# Patient Record
Sex: FEMALE | Race: WHITE | NOT HISPANIC OR LATINO | Employment: FULL TIME | URBAN - METROPOLITAN AREA
[De-identification: names, ages, dates, MRNs, and addresses within clinical notes are randomized per-mention and may not be internally consistent; named-entity substitution may affect disease eponyms.]

---

## 2018-04-02 ENCOUNTER — OFFICE VISIT (OUTPATIENT)
Dept: RHEUMATOLOGY | Facility: CLINIC | Age: 23
End: 2018-04-02
Payer: COMMERCIAL

## 2018-04-02 VITALS — DIASTOLIC BLOOD PRESSURE: 78 MMHG | WEIGHT: 145.19 LBS | SYSTOLIC BLOOD PRESSURE: 118 MMHG

## 2018-04-02 DIAGNOSIS — R76.8 POSITIVE ANA (ANTINUCLEAR ANTIBODY): Primary | ICD-10-CM

## 2018-04-02 LAB
ALBUMIN SERPL-MCNC: 4.3 G/DL (ref 3.1–4.7)
ALP SERPL-CCNC: 42 IU/L (ref 40–104)
ALT (SGPT): 16 IU/L (ref 3–33)
AST SERPL-CCNC: 20 IU/L (ref 10–40)
BASOPHILS NFR BLD: 0 K/UL (ref 0–0.2)
BASOPHILS NFR BLD: 0.5 %
BILIRUB SERPL-MCNC: 0.7 MG/DL (ref 0.3–1)
BUN SERPL-MCNC: 10 MG/DL (ref 8–20)
CALCIUM SERPL-MCNC: 9.2 MG/DL (ref 7.7–10.4)
CHLORIDE: 104 MMOL/L (ref 98–110)
CO2 SERPL-SCNC: 25.6 MMOL/L (ref 22.8–31.6)
CREATININE: 0.67 MG/DL (ref 0.6–1.4)
EOSINOPHIL NFR BLD: 0.1 K/UL (ref 0–0.7)
EOSINOPHIL NFR BLD: 1.4 %
ERYTHROCYTE [DISTWIDTH] IN BLOOD BY AUTOMATED COUNT: 12.1 % (ref 11.7–14.9)
GLUCOSE: 87 MG/DL (ref 70–99)
GRAN #: 3.9 K/UL (ref 1.4–6.5)
GRAN%: 62.1 %
HCT VFR BLD AUTO: 40.5 % (ref 36–48)
HGB BLD-MCNC: 13.3 G/DL (ref 12–15)
IMMATURE GRANS (ABS): 0 K/UL (ref 0–1)
IMMATURE GRANULOCYTES: 0.2 %
LYMPH #: 1.7 K/UL (ref 1.2–3.4)
LYMPH%: 26.4 %
MCH RBC QN AUTO: 30.7 PG (ref 25–35)
MCHC RBC AUTO-ENTMCNC: 32.8 G/DL (ref 31–36)
MCV RBC AUTO: 93.5 FL (ref 79–98)
MONO #: 0.6 K/UL (ref 0.1–0.6)
MONO%: 9.4 %
NUCLEATED RBCS: 0 %
PLATELET # BLD AUTO: 329 K/UL (ref 140–440)
PMV BLD AUTO: 9.9 FL (ref 8.8–12.7)
POTASSIUM SERPL-SCNC: 3.7 MMOL/L (ref 3.5–5)
PROT SERPL-MCNC: 7.7 G/DL (ref 6–8.2)
RBC # BLD AUTO: 4.33 M/UL (ref 3.5–5.5)
SODIUM: 139 MMOL/L (ref 134–144)
TSH SERPL DL<=0.005 MIU/L-ACNC: 0.87 ULU/ML (ref 0.3–5.6)
WBC # BLD AUTO: 6.3 K/UL (ref 5–10)

## 2018-04-02 PROCEDURE — 99203 OFFICE O/P NEW LOW 30 MIN: CPT | Mod: ,,, | Performed by: INTERNAL MEDICINE

## 2018-04-02 RX ORDER — FEXOFENADINE HCL 60 MG
60 TABLET ORAL DAILY
COMMUNITY

## 2018-04-02 RX ORDER — OMEPRAZOLE 20 MG/1
CAPSULE, DELAYED RELEASE ORAL
COMMUNITY
Start: 2017-12-29

## 2018-04-02 RX ORDER — NORGESTIMATE AND ETHINYL ESTRADIOL 0.25-0.035
KIT ORAL
COMMUNITY
Start: 2018-03-14 | End: 2023-06-16

## 2018-04-02 NOTE — LETTER
April 2, 2018        Lita Corbett, RADHA  [address]  [place] [address]             [facility]  [address]  [address]  [place] [address]  Phone: [phone]  Fax: [phone]   Patient: Estrella Miller   MR Number: [id number]   YOB: 1995   Date of Visit: 4/2/2018       Dear Dr. Corbett:    Thank you for referring Estrella Miller to me for evaluation. Attached you will find relevant portions of my assessment and plan of care.    If you have questions, please do not hesitate to call me. I look forward to following Estrella Miller along with you.    Sincerely,      Jp Salas MD            CC  No Recipients    Enclosure

## 2018-04-02 NOTE — LETTER
April 2, 2018        No Recipients             [facility]  [address]  [address]  [place] [address]  Phone: [phone]  Fax: [phone]   Patient: Estrella Miller   MR Number: [id number]   YOB: 1995   Date of Visit: 4/2/2018       Dear Dr. Silva Recipients:    Thank you for referring Estrella Miller to me for evaluation. Below are the relevant portions of my assessment and plan of care.            If you have questions, please do not hesitate to call me. I look forward to following Estrella along with you.    Sincerely,      Jp Salas MD           CC  No Recipients

## 2018-04-02 NOTE — PROGRESS NOTES
[facility]        NEW PATIENT      Subjective:       Patient ID:   NAME: Estrella Miller : 1995     22 y.o. female    Referring Doc: No ref. provider found  Other Physicians:    Chief Complaint:  Possible Lupus      History of Present Illness:     New patient referred for positive KOKO .Patient doesn't know why they KOKO was done in the first place. She denies any chronic fatigue ,skin rashes  Or photosensitivity. No Raynaud's No chest pains, cough or shortness of breath. No mucosal ulcerations.No sicca sxs.  No joint swelling or pain.         ROS:   GEN: no fevers night sweats or significant weight changes    fatigue  HEENT: no HA's, changes in vision , no mouth ulcers, no sicca symptoms, no scalp tenderness, jaw claudication  CV: no CP, SOB, PND, DUTTA or orthopnea,no palpitations  PULM:no SOB, cough, hemoptysis, sputum or pleuritic pain  GI: no abdominal pain, nausea, vomiting, constipation,+ bouts of  Diarrhea, no blood or mucus.Colonoscopy done,no dx of inflammatory bowel disease,  No melanotic stools, BRBPR, or hematemesis, no dysphagia  : no hematuria, dysuria  NEURO:no paresthesias, headaches, visual disturbances, muscle weakness  SKIN:  no rashes , erythema, bruising, or swelling, no Raynauds, no photosensitivity  MUSCULOSKELETAL:no joint swelling, no prolonged AM stiffness, no back pain   PSYCH:    Insomnia,   depression,  anxiety    Medications:    Current Outpatient Prescriptions:     ascorbic acid (VITAMIN C) 500 MG tablet, Take 500 mg by mouth once daily., Disp: , Rfl:     ESTARYLLA 0.25-35 mg-mcg per tablet, , Disp: , Rfl:     fexofenadine (ALLEGRA) 60 MG tablet, Take 60 mg by mouth once daily., Disp: , Rfl:     fluticasone (FLONASE) 50 mcg/actuation nasal spray, 2 sprays by Nasal route once daily.  , Disp: , Rfl:     ibuprofen (ADVIL,MOTRIN) 200 MG tablet, Take 600 mg by mouth every 6 to 8 hours as needed.  , Disp: , Rfl:     lactobacillus rhamnosus, GG, (CULTURELLE) 10  billion cell capsule, Take 1 capsule by mouth once daily., Disp: , Rfl:     multivitamin (MULTIVITAMIN) per tablet, Take 1 tablet by mouth once daily.  , Disp: , Rfl:     omeprazole (PRILOSEC) 20 MG capsule, , Disp: , Rfl:     Current Facility-Administered Medications:     dextromethorphan-guaifenesin  mg per 12 hr tablet 1 tablet, 1 tablet, Oral, Q12H PRN, Mayuri Ambrose MD      FAMILY HISTORY: negative for Connective Tissue Disease      PAST MEDICAL HISTORY:IBS    PAST SURGICAL HISTORY:wisdom teeth removal    SOCIAL HISTORY:In college.No smoking; rarely ETOH    ALLERGIES:NKDA          Objective:     Vitals:  Blood pressure 118/78, weight 65.9 kg (145 lb 3.2 oz).    Physical Examination:   GEN: no apparent distress, comfortable; AAOx3  SKIN: no rashes, no lesions, no sclerodactyly or induration, no Raynaud's, no periungual erythema  HEAD: normal  EYES: no pallor, no icterus, PERRLA  ENT:  no thrush,no mucosal dryness or ulcerations  NECK: no masses, thyroid normal, trachea midline, no LAD/LN's, supple  CV:   S1 and S2 regular, no murmurs, gallop or rubs  CHEST: Normal respiratory effort;  normal breath sounds; no rubs, no wheezes, no crackles.   ABDOM: nontender and nondistended; soft; ; no rebound/guarding,no masses  MUSC/Skeletal: ROM normal; no crepitus; joints without synovitis, no deformities   EXTREM: no clubbing, cyanosis, edema, normal pulses.  NEURO: grossly intact; motorWNL; AAOx3; no tremors  PSYCH: normal mood, affect and behavior  LYMPH: normal cervical, supraclavicular            Labs:   @RESUFAST(WBC,HGB,HCT,MCV,PLT)  )@RESUFAST(NA,K,CL,CO2,GLU,BUN,Creatinine,Calcium,PROT,Albumin,Bilitot,Alkphos,AST,ALT,KOKO,Sed Rate,CRP,RF,CCP)      Radiology/Diagnostic Studies:    I have reviewed all available labs and XRay reports    Assessment/Plan:   22 y.o. female with positive KOKO 1:640 titer. No other signs or symptoms of lupus or other connective tissue disorder att this time        PLAN: KOKO  profile, CBC ,CMP ,complement levels ,urinalysis, rheumatoid factor and CCP. TSH, antithyroid antibodies, antiphospholipids          Discussion:     I have explained all of the above in detail and the patient understands all of the current recommendation(s). I have answered all of their questions to the best of my ability and to their complete satisfaction.      I have reviewed the risks and benefits of the medication in detail with patient, who understands and wishes to proceed. Printed information regarding the disease and/or medication was also provided.        RTC 3  months or before if needed        Electronically signed by Jp Salas MD

## 2018-04-03 LAB
C3 SERPL-MCNC: 164 MG/DL (ref 82–167)
C4 NEF SERPL-MCNC: 16 MG/DL (ref 14–44)
CARDIOLIPIN IGA SER IA-ACNC: <9 APL U/ML (ref 0–11)
CARDIOLIPIN IGG SER IA-ACNC: <9 GPL U/ML (ref 0–14)
CARDIOLIPIN IGM SER IA-ACNC: <9 MPL U/ML (ref 0–12)
DRVVT CONFIRM: 36.3 SEC (ref 0–47)
LUPUS ANTICOAGULANT INTERPRETATION: NORMAL
PTT-LA MIX: 36.3 SEC (ref 0–51.9)
RHEUMATOID FACT SERPL-ACNC: <10 IU/ML (ref 0–13.9)

## 2018-04-04 LAB — CCP ANTIBODIES IGG/IGA: 6 UNITS (ref 0–19)

## 2018-04-05 LAB
B2 GLYCOPROTEIN I IGA: <9 GPI IGA UNITS (ref 0–25)
B2 GLYCOPROTEIN I IGG: 14 GPI IGG UNITS (ref 0–20)
B2 GLYCOPROTEIN I IGM: <9 GPI IGM UNITS (ref 0–32)